# Patient Record
Sex: FEMALE | Race: WHITE | NOT HISPANIC OR LATINO
[De-identification: names, ages, dates, MRNs, and addresses within clinical notes are randomized per-mention and may not be internally consistent; named-entity substitution may affect disease eponyms.]

---

## 2024-04-05 ENCOUNTER — APPOINTMENT (OUTPATIENT)
Dept: GASTROENTEROLOGY | Facility: CLINIC | Age: 79
End: 2024-04-05
Payer: MEDICARE

## 2024-04-05 VITALS
SYSTOLIC BLOOD PRESSURE: 120 MMHG | DIASTOLIC BLOOD PRESSURE: 60 MMHG | WEIGHT: 104 LBS | HEIGHT: 58 IN | HEART RATE: 62 BPM | OXYGEN SATURATION: 98 % | BODY MASS INDEX: 21.83 KG/M2 | TEMPERATURE: 98 F

## 2024-04-05 DIAGNOSIS — I10 ESSENTIAL (PRIMARY) HYPERTENSION: ICD-10-CM

## 2024-04-05 DIAGNOSIS — Z86.79 PERSONAL HISTORY OF OTHER DISEASES OF THE CIRCULATORY SYSTEM: ICD-10-CM

## 2024-04-05 DIAGNOSIS — R15.9 FULL INCONTINENCE OF FECES: ICD-10-CM

## 2024-04-05 PROBLEM — Z00.00 ENCOUNTER FOR PREVENTIVE HEALTH EXAMINATION: Status: ACTIVE | Noted: 2024-04-05

## 2024-04-05 PROCEDURE — 99203 OFFICE O/P NEW LOW 30 MIN: CPT

## 2024-04-05 PROCEDURE — 82270 OCCULT BLOOD FECES: CPT

## 2024-04-05 RX ORDER — METOPROLOL SUCCINATE 25 MG/1
25 TABLET, EXTENDED RELEASE ORAL
Refills: 0 | Status: ACTIVE | COMMUNITY

## 2024-04-05 RX ORDER — CHOLECALCIFEROL (VITAMIN D3) 25 MCG
TABLET ORAL
Refills: 0 | Status: ACTIVE | COMMUNITY

## 2024-04-05 NOTE — HISTORY OF PRESENT ILLNESS
[FreeTextEntry1] : I saw patient Karolina Claros in the office today.  The patient is an 79-year-old female who is a resident of assisted living.  The patient denies a history of diabetes or coronary issues but is a poor historian.  She is currently on a beta-blocker.  The patient states she consumes 1 caffeinated beverage a day, she does not smoke, nor does she drink alcohol.  The patient has no complaint of acid reflux.  The patient notes that she has intermittent episodes of fecal soiling.  The patient has no abdominal cramping or bloating but spontaneously will notice that there is stool in her diaper.  There has been no report of blood in the stool or on the toilet tissue.  The patient states that she usually moves her bowels on a regular basis and denies taking any laxatives.  The patient denies a history consistent with lactose intolerance.  She is not complaining of any urinary symptoms at the present time.  There is no documented history of a colonoscopy in the recent past.  The patient ambulates with a walker.

## 2024-04-05 NOTE — ASSESSMENT
[FreeTextEntry1] : The patient is an 79-year-old female who is a resident of a nursing facility.  The patient has a history of hypertension.  The patient complains of fecal incontinence which appears to be on the basis of a markedly decreased sphincter tone.  There are no signs of intestinal obstruction and the stool is guaiac negative.  We will provide the patient with a bulking agent to decrease the loose consistency of the stool.  Hopefully this will prevent spontaneous soiling.  Kegel exercises if the patient would be compliant would be helpful as well.  The patient should call me in several weeks with a progress report.  I do not feel that invasive testing is indicated or would be helpful at the present time.

## 2024-04-05 NOTE — REVIEW OF SYSTEMS
[Fever] : no fever [Chills] : no chills [Feeling Poorly] : feeling poorly [Recent Weight Loss (___ Lbs)] : no recent weight loss [Chest Pain] : no chest pain [Palpitations] : no palpitations [SOB on Exertion] : no shortness of breath during exertion [Abdominal Pain] : no abdominal pain [Vomiting] : no vomiting [Constipation] : no constipation [Diarrhea] : no diarrhea [Heartburn] : no heartburn [Melena (black stool)] : no melena [Bleeding] : no bleeding [Fecal Incontinence (soiling)] : fecal incontinence

## 2024-04-05 NOTE — PHYSICAL EXAM
[Alert] : alert [No Acute Distress] : no acute distress [No Respiratory Distress] : no respiratory distress [Auscultation Breath Sounds / Voice Sounds] : lungs were clear to auscultation bilaterally [Bowel Sounds] : normal bowel sounds [No Masses] : no abdominal mass palpated [Abdomen Soft] : soft [de-identified] : There is a grade 2/6 systolic murmur [de-identified] : There is a markedly decreased sphincter tone there is pasty stool which is guaiac negative.

## 2025-02-06 ENCOUNTER — EMERGENCY (EMERGENCY)
Facility: HOSPITAL | Age: 80
LOS: 1 days | Discharge: NURSING, REHAB OR LONG-TERM CARE | End: 2025-02-06
Attending: STUDENT IN AN ORGANIZED HEALTH CARE EDUCATION/TRAINING PROGRAM | Admitting: STUDENT IN AN ORGANIZED HEALTH CARE EDUCATION/TRAINING PROGRAM
Payer: MEDICARE

## 2025-02-06 VITALS
OXYGEN SATURATION: 94 % | TEMPERATURE: 99 F | WEIGHT: 110.01 LBS | RESPIRATION RATE: 19 BRPM | HEART RATE: 64 BPM | HEIGHT: 61 IN | DIASTOLIC BLOOD PRESSURE: 68 MMHG | SYSTOLIC BLOOD PRESSURE: 156 MMHG

## 2025-02-06 LAB
ALBUMIN SERPL ELPH-MCNC: 3.6 G/DL — SIGNIFICANT CHANGE UP (ref 3.3–5)
ALP SERPL-CCNC: 71 U/L — SIGNIFICANT CHANGE UP (ref 30–120)
ALT FLD-CCNC: 16 U/L — SIGNIFICANT CHANGE UP (ref 10–60)
ANION GAP SERPL CALC-SCNC: 9 MMOL/L — SIGNIFICANT CHANGE UP (ref 5–17)
AST SERPL-CCNC: 18 U/L — SIGNIFICANT CHANGE UP (ref 10–40)
BASOPHILS # BLD AUTO: 0.07 K/UL — SIGNIFICANT CHANGE UP (ref 0–0.2)
BASOPHILS NFR BLD AUTO: 0.5 % — SIGNIFICANT CHANGE UP (ref 0–2)
BILIRUB SERPL-MCNC: 0.4 MG/DL — SIGNIFICANT CHANGE UP (ref 0.2–1.2)
BUN SERPL-MCNC: 18 MG/DL — SIGNIFICANT CHANGE UP (ref 7–23)
CALCIUM SERPL-MCNC: 9.7 MG/DL — SIGNIFICANT CHANGE UP (ref 8.4–10.5)
CHLORIDE SERPL-SCNC: 105 MMOL/L — SIGNIFICANT CHANGE UP (ref 96–108)
CO2 SERPL-SCNC: 29 MMOL/L — SIGNIFICANT CHANGE UP (ref 22–31)
CREAT SERPL-MCNC: 0.87 MG/DL — SIGNIFICANT CHANGE UP (ref 0.5–1.3)
EGFR: 67 ML/MIN/1.73M2 — SIGNIFICANT CHANGE UP
EOSINOPHIL # BLD AUTO: 0.15 K/UL — SIGNIFICANT CHANGE UP (ref 0–0.5)
EOSINOPHIL NFR BLD AUTO: 1 % — SIGNIFICANT CHANGE UP (ref 0–6)
FLUAV AG NPH QL: SIGNIFICANT CHANGE UP
FLUBV AG NPH QL: SIGNIFICANT CHANGE UP
GLUCOSE SERPL-MCNC: 136 MG/DL — HIGH (ref 70–99)
HCT VFR BLD CALC: 39.9 % — SIGNIFICANT CHANGE UP (ref 34.5–45)
HGB BLD-MCNC: 13.1 G/DL — SIGNIFICANT CHANGE UP (ref 11.5–15.5)
IMM GRANULOCYTES NFR BLD AUTO: 0.3 % — SIGNIFICANT CHANGE UP (ref 0–0.9)
LIDOCAIN IGE QN: 38 U/L — SIGNIFICANT CHANGE UP (ref 16–77)
LYMPHOCYTES # BLD AUTO: 0.59 K/UL — LOW (ref 1–3.3)
LYMPHOCYTES # BLD AUTO: 4 % — LOW (ref 13–44)
MCHC RBC-ENTMCNC: 29.7 PG — SIGNIFICANT CHANGE UP (ref 27–34)
MCHC RBC-ENTMCNC: 32.8 G/DL — SIGNIFICANT CHANGE UP (ref 32–36)
MCV RBC AUTO: 90.5 FL — SIGNIFICANT CHANGE UP (ref 80–100)
MONOCYTES # BLD AUTO: 0.74 K/UL — SIGNIFICANT CHANGE UP (ref 0–0.9)
MONOCYTES NFR BLD AUTO: 5.1 % — SIGNIFICANT CHANGE UP (ref 2–14)
NEUTROPHILS # BLD AUTO: 13.04 K/UL — HIGH (ref 1.8–7.4)
NEUTROPHILS NFR BLD AUTO: 89.1 % — HIGH (ref 43–77)
NRBC # BLD: 0 /100 WBCS — SIGNIFICANT CHANGE UP (ref 0–0)
NRBC BLD-RTO: 0 /100 WBCS — SIGNIFICANT CHANGE UP (ref 0–0)
PLATELET # BLD AUTO: 261 K/UL — SIGNIFICANT CHANGE UP (ref 150–400)
POTASSIUM SERPL-MCNC: 4.1 MMOL/L — SIGNIFICANT CHANGE UP (ref 3.5–5.3)
POTASSIUM SERPL-SCNC: 4.1 MMOL/L — SIGNIFICANT CHANGE UP (ref 3.5–5.3)
PROT SERPL-MCNC: 7.9 G/DL — SIGNIFICANT CHANGE UP (ref 6–8.3)
RBC # BLD: 4.41 M/UL — SIGNIFICANT CHANGE UP (ref 3.8–5.2)
RBC # FLD: 13.9 % — SIGNIFICANT CHANGE UP (ref 10.3–14.5)
RSV RNA NPH QL NAA+NON-PROBE: SIGNIFICANT CHANGE UP
SARS-COV-2 RNA SPEC QL NAA+PROBE: SIGNIFICANT CHANGE UP
SODIUM SERPL-SCNC: 143 MMOL/L — SIGNIFICANT CHANGE UP (ref 135–145)
WBC # BLD: 14.63 K/UL — HIGH (ref 3.8–10.5)
WBC # FLD AUTO: 14.63 K/UL — HIGH (ref 3.8–10.5)

## 2025-02-06 RX ORDER — ONDANSETRON 4 MG/1
4 TABLET, ORALLY DISINTEGRATING ORAL ONCE
Refills: 0 | Status: COMPLETED | OUTPATIENT
Start: 2025-02-06 | End: 2025-02-06

## 2025-02-06 RX ORDER — BACTERIOSTATIC SODIUM CHLORIDE 0.9 %
1000 VIAL (ML) INJECTION ONCE
Refills: 0 | Status: COMPLETED | OUTPATIENT
Start: 2025-02-06 | End: 2025-02-06

## 2025-02-06 RX ADMIN — Medication 1000 MILLILITER(S): at 22:18

## 2025-02-06 RX ADMIN — ONDANSETRON 4 MILLIGRAM(S): 4 TABLET, ORALLY DISINTEGRATING ORAL at 22:18

## 2025-02-06 RX ADMIN — Medication 1000 MILLILITER(S): at 23:18

## 2025-02-06 NOTE — ED ADULT TRIAGE NOTE - AVIAN FLU SYMPTOMS
----- Message from Rosario Tolbert sent at 11/14/2018 11:22 AM CST -----  Contact: Patient   Patient would like a call back at [phone], Regards to his test machine is down.    Thanks  td  
Returned patient's call regarding his true metrix blood glucose machine. Informed patient that he can get one from the office on tomorrow. Patient will  tomorrow.  
No

## 2025-02-06 NOTE — ED ADULT NURSE NOTE - NSICDXPASTMEDICALHX_GEN_ALL_CORE_FT
PAST MEDICAL HISTORY:  Chronic CHF     GERD (gastroesophageal reflux disease)     Hyperlipidemia

## 2025-02-06 NOTE — ED ADULT NURSE NOTE - OBJECTIVE STATEMENT
78 yo BIBKEMI from [facility]  for NVD , as per pt she had some chinese food and son after she started throwing up and had a couple episodes of diarrhea. Pt denies any bloody stools, hematuria, fever and or chills at this time .

## 2025-02-07 VITALS
RESPIRATION RATE: 17 BRPM | TEMPERATURE: 98 F | HEART RATE: 76 BPM | OXYGEN SATURATION: 94 % | SYSTOLIC BLOOD PRESSURE: 128 MMHG | DIASTOLIC BLOOD PRESSURE: 78 MMHG

## 2025-02-07 LAB
APPEARANCE UR: CLEAR — SIGNIFICANT CHANGE UP
BACTERIA # UR AUTO: ABNORMAL /HPF
BILIRUB UR-MCNC: NEGATIVE — SIGNIFICANT CHANGE UP
COLOR SPEC: YELLOW — SIGNIFICANT CHANGE UP
DIFF PNL FLD: ABNORMAL
EPI CELLS # UR: PRESENT
GLUCOSE UR QL: NEGATIVE MG/DL — SIGNIFICANT CHANGE UP
KETONES UR-MCNC: NEGATIVE MG/DL — SIGNIFICANT CHANGE UP
LEGIONELLA AG UR QL: NEGATIVE — SIGNIFICANT CHANGE UP
LEUKOCYTE ESTERASE UR-ACNC: ABNORMAL
NITRITE UR-MCNC: NEGATIVE — SIGNIFICANT CHANGE UP
PH UR: 6.5 — SIGNIFICANT CHANGE UP (ref 5–8)
PROT UR-MCNC: NEGATIVE MG/DL — SIGNIFICANT CHANGE UP
RBC CASTS # UR COMP ASSIST: 5 /HPF — HIGH (ref 0–4)
SP GR SPEC: 1.06 — HIGH (ref 1–1.03)
UROBILINOGEN FLD QL: 0.2 MG/DL — SIGNIFICANT CHANGE UP (ref 0.2–1)
WBC UR QL: 30 /HPF — HIGH (ref 0–5)

## 2025-02-07 RX ORDER — METOPROLOL SUCCINATE 25 MG
1 TABLET, EXTENDED RELEASE 24 HR ORAL
Refills: 0 | DISCHARGE

## 2025-02-07 RX ORDER — CHOLESTYRAMINE 4 G/9G
4 POWDER, FOR SUSPENSION ORAL
Refills: 0 | DISCHARGE

## 2025-02-07 RX ORDER — CEFTRIAXONE 250 MG/1
1000 INJECTION, POWDER, FOR SOLUTION INTRAMUSCULAR; INTRAVENOUS ONCE
Refills: 0 | Status: COMPLETED | OUTPATIENT
Start: 2025-02-07 | End: 2025-02-07

## 2025-02-07 RX ADMIN — CEFTRIAXONE 100 MILLIGRAM(S): 250 INJECTION, POWDER, FOR SOLUTION INTRAMUSCULAR; INTRAVENOUS at 01:36

## 2025-02-07 RX ADMIN — CEFTRIAXONE 1000 MILLIGRAM(S): 250 INJECTION, POWDER, FOR SOLUTION INTRAMUSCULAR; INTRAVENOUS at 02:06

## 2025-02-07 NOTE — ED PROVIDER NOTE - DIFFERENTIAL DIAGNOSIS
Ddx includes but not limited to gastritis, gastroenteritis, IBS, IBD, colitis, SBO, diverticulitis, viral illness, pancreatitis, cholecystitis Differential Diagnosis

## 2025-02-07 NOTE — ED PROVIDER NOTE - PROGRESS NOTE DETAILS
Results of CT, blood work, UA discussed with patient son, Sinan.  He was made aware of patient's CT showing wall thickening of the stomach and that a neoplasm must be ruled out.  He states that patient does follow with GI for various "stomach issues" and he was told that patient needs a nonemergent endoscopy for further evaluation to r/o neoplasm.  He expressed understanding.  Will discharge patient with antibiotics for UTI.  Return precautions discussed.  Transportation called. Results of CT, blood work, UA discussed with patient son, Sinan.  He was made aware of patient's CT showing wall thickening of the stomach and that a neoplasm must be ruled out.  He states that patient does follow with GI for various "stomach issues" and he was told that patient needs a nonemergent endoscopy for further evaluation to r/o neoplasm.  He expressed understanding.  Will discharge patient with antibiotics for UTI.  Return precautions discussed.  Transportation called. No episodes of vomiting or diarrhea in ED.

## 2025-02-07 NOTE — ED PROVIDER NOTE - CLINICAL SUMMARY MEDICAL DECISION MAKING FREE TEXT BOX
79-year-old female presents with abdominal pain, vomiting, diarrhea.  Patient brought in by ambulance from assisted living.  Patient reports diarrhea x 1, vomiting x 1 episode.  No focal findings on exam, abdomen soft, nontender/nondistended.  Check labs, UA, lipase, viral swab, CT abdomen and pelvis, hydrate, Zofran, reassess.

## 2025-02-07 NOTE — ED PROVIDER NOTE - OBJECTIVE STATEMENT
79-year-old female with a history of chronic CHF, HLD, GERD presents with abdominal pain, vomiting, diarrhea.  Patient brought in by ambulance from [facility] assisted living.  Patient states that last night, she had diarrhea x 1, vomiting x 1 nonbloody.  It is associated with decreased p.o. intake and generalized abdominal pain although patient currently states she has no abdominal pain.  Patient denies fever, chest pain, shortness of breath, dysuria.  PMD Alexandra Massey

## 2025-02-07 NOTE — ED PROVIDER NOTE - PATIENT PORTAL LINK FT
You can access the FollowMyHealth Patient Portal offered by [facility] by registering at the following website: [email or web address]. By joining [employer]’s FollowMyHealth portal, you will also be able to view your health information using other applications (apps) compatible with our system.

## 2025-02-07 NOTE — ED PROVIDER NOTE - NSFOLLOWUPINSTRUCTIONS_ED_ALL_ED_FT
Please take the antibiotics as prescribed and follow up with your primary care doctor and GI doctor. Be sure to obtain an upper endoscopy to evaluate your stomach and r/o malignancy.  Return to the ER for persistent pain, fever, vomiting, diarrhea, dehydration, or any other concerns.     Viral Gastroenteritis, Adult       Viral gastroenteritis is also known as the stomach flu. This condition may affect your stomach, small intestine, and large intestine. It can cause sudden watery diarrhea, fever, and vomiting. This condition is caused by many different viruses. These viruses can be passed from person to person very easily (are contagious).    Diarrhea and vomiting can make you feel weak and cause you to become dehydrated. You may not be able to keep fluids down. Dehydration can make you tired and thirsty, cause you to have a dry mouth, and decrease how often you urinate. It is important to replace the fluids that you lose from diarrhea and vomiting.    What are the causes?  Gastroenteritis is caused by many viruses, including rotavirus and norovirus. Norovirus is the most common cause in adults. You can get sick after being exposed to the viruses from other people. You can also get sick by:    Eating food, drinking water, or touching a surface contaminated with one of these viruses.  Sharing utensils or other personal items with an infected person.    What increases the risk?  You are more likely to develop this condition if you:    Have a weak body defense system (immune system).  Live with one or more children who are younger than 2 years old.  Live in a nursing home.  Travel on cruise ships.    What are the signs or symptoms?  Symptoms of this condition start suddenly 1–3 days after exposure to a virus. Symptoms may last for a few days or for as long as a week. Common symptoms include watery diarrhea and vomiting. Other symptoms include:    Fever.  Headache.  Fatigue.  Pain in the abdomen.  Chills.  Weakness.  Nausea.  Muscle aches.  Loss of appetite.    How is this diagnosed?  This condition is diagnosed with a medical history and physical exam. You may also have a stool test to check for viruses or other infections.    How is this treated?  This condition typically goes away on its own. The focus of treatment is to prevent dehydration and restore lost fluids (rehydration). This condition may be treated with:    An oral rehydration solution (ORS) to replace important salts and minerals (electrolytes) in your body. Take this if told by your health care provider. This is a drink that is sold at pharmacies and retail stores.  Medicines to help with your symptoms.  Probiotic supplements to reduce symptoms of diarrhea.  Fluids given through an IV, if dehydration is severe.    Older adults and people with other diseases or a weak immune system are at higher risk for dehydration.    Follow these instructions at home:         Eating and drinking     Take an ORS as told by your health care provider.  Drink clear fluids in small amounts as you are able. Clear fluids include:    Water.  Ice chips.  Diluted fruit juice.  Low-calorie sports drinks.  Drink enough fluid to keep your urine pale yellow.  Eat small amounts of healthy foods every 3–4 hours as you are able. This may include whole grains, fruits, vegetables, lean meats, and yogurt.  Avoid fluids that contain a lot of sugar or caffeine, such as energy drinks, sports drinks, and soda.  Avoid spicy or fatty foods.  Avoid alcohol.        General instructions    Wash your hands often, especially after having diarrhea or vomiting. If soap and water are not available, use hand .  Make sure that all people in your household wash their hands well and often.  Take over-the-counter and prescription medicines only as told by your health care provider.  Rest at home while you recover.  Watch your condition for any changes.  Take a warm bath to relieve any burning or pain from frequent diarrhea episodes.  Keep all follow-up visits as told by your health care provider. This is important.    Contact a health care provider if you:  Cannot keep fluids down.  Have symptoms that get worse.  Have new symptoms.  Feel light-headed or dizzy.  Have muscle cramps.    Get help right away if you:  Have chest pain.  Feel extremely weak or you faint.  See blood in your vomit.  Have vomit that looks like coffee grounds.  Have bloody or black stools or stools that look like tar.  Have a severe headache, a stiff neck, or both.  Have a rash.  Have severe pain, cramping, or bloating in your abdomen.  Have trouble breathing or you are breathing very quickly.  Have a fast heartbeat.  Have skin that feels cold and clammy.  Feel confused.  Have pain when you urinate.  Have signs of dehydration, such as:    Dark urine, very little urine, or no urine.  Cracked lips.  Dry mouth.  Sunken eyes.  Sleepiness.  Weakness.    Summary  Viral gastroenteritis is also known as the stomach flu. It can cause sudden watery diarrhea, fever, and vomiting.  This condition can be passed from person to person very easily (is contagious).  Take an ORS if told by your health care provider. This is a drink that is sold at pharmacies and retail stores.  Wash your hands often, especially after having diarrhea or vomiting. If soap and water are not available, use hand .    ADDITIONAL NOTES AND INSTRUCTIONS    Please follow up with your Primary MD in 24-48 hr.  Seek immediate medical care for any new/worsening signs or symptoms.

## 2025-02-07 NOTE — ED PROVIDER NOTE - PROVIDER TOKENS
PROVIDER:[TOKEN:[[id number]]] PROVIDER:[TOKEN:[[id number]]],PROVIDER:[TOKEN:[[id number]]]

## 2025-02-07 NOTE — ED PROVIDER NOTE - CARE PROVIDER_API CALL
Alexandra Massey  [address] [place] [address]  Phone: ([phone]  Fax: ()-  Follow Up Time:    Alexandra Massey  [address] [place] [address]  Phone: ([phone]  Fax: ()-  Follow Up Time:     Freya Kirby  Nephrology  [address] [place] [address]  Phone: [phone]  Fax: [phone]  Follow Up Time:

## 2025-02-07 NOTE — ED ADULT NURSE REASSESSMENT NOTE - NS ED NURSE REASSESS COMMENT FT1
report given to Parvez at [address] [facility]; informed pt has UTI and prescription will be sent to [facility] in [place]; verbalized understanding

## 2025-02-08 LAB
CULTURE RESULTS: SIGNIFICANT CHANGE UP
SPECIMEN SOURCE: SIGNIFICANT CHANGE UP